# Patient Record
Sex: FEMALE | Race: WHITE | Employment: STUDENT | URBAN - METROPOLITAN AREA
[De-identification: names, ages, dates, MRNs, and addresses within clinical notes are randomized per-mention and may not be internally consistent; named-entity substitution may affect disease eponyms.]

---
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
## 2023-01-22 DIAGNOSIS — F41.9 ANXIETY: ICD-10-CM

## 2023-01-22 DIAGNOSIS — F42.9 OBSESSIVE-COMPULSIVE DISORDER, UNSPECIFIED TYPE: ICD-10-CM

## 2023-01-23 DIAGNOSIS — F41.9 ANXIETY: ICD-10-CM

## 2023-01-23 RX ORDER — BUPROPION HYDROCHLORIDE 150 MG/1
TABLET ORAL
Qty: 90 TABLET | Refills: 1 | Status: SHIPPED | OUTPATIENT
Start: 2023-01-23

## 2023-01-23 RX ORDER — FLUOXETINE HYDROCHLORIDE 20 MG/1
CAPSULE ORAL
Qty: 270 CAPSULE | Refills: 0 | Status: SHIPPED | OUTPATIENT
Start: 2023-01-23

## 2023-01-23 NOTE — TELEPHONE ENCOUNTER
Requested Prescriptions     Pending Prescriptions Disp Refills    buPROPion (WELLBUTRIN XL) 150 MG extended release tablet [Pharmacy Med Name: buPROPion HCL  MG TABLET] 90 tablet 1     Sig: TAKE ONE TABLET BY MOUTH EVERY MORNING     Last ov 7/6/22 physical  Last lab 8/12/22    Next ov n/a

## 2023-01-23 NOTE — TELEPHONE ENCOUNTER
Requested Prescriptions     Pending Prescriptions Disp Refills    FLUoxetine (PROZAC) 20 MG capsule [Pharmacy Med Name: FLUoxetine HCL 20 MG CAPSULE] 270 capsule 1     Sig: TAKE THREE CAPSULES BY MOUTH EVERY MORNING     Last ov 7/6/22 physical  Last lab 8/12/22  Next ov n/a

## 2023-03-23 ENCOUNTER — TELEPHONE (OUTPATIENT)
Dept: FAMILY MEDICINE CLINIC | Age: 18
End: 2023-03-23

## 2023-04-07 DIAGNOSIS — F40.10 SOCIAL ANXIETY DISORDER: ICD-10-CM

## 2023-04-07 DIAGNOSIS — I10 ESSENTIAL HYPERTENSION: ICD-10-CM

## 2023-04-07 RX ORDER — PROPRANOLOL HCL 60 MG
CAPSULE, EXTENDED RELEASE 24HR ORAL
Qty: 90 CAPSULE | Refills: 1 | Status: SHIPPED | OUTPATIENT
Start: 2023-04-07

## 2023-04-07 NOTE — TELEPHONE ENCOUNTER
Requested Prescriptions     Pending Prescriptions Disp Refills    propranolol (INDERAL LA) 60 MG extended release capsule [Pharmacy Med Name: PROPRANOLOL ER 60 MG CAPSULE] 90 capsule 1     Sig: TAKE ONE CAPSULE BY MOUTH DAILY     Last Office Visit 7/6/22  Last Labs 8/12/2022  No future visit scheduled

## 2023-04-24 DIAGNOSIS — N92.6 IRREGULAR MENSES: ICD-10-CM

## 2023-04-24 DIAGNOSIS — N91.2 AMENORRHEA: ICD-10-CM

## 2023-04-24 RX ORDER — ETONOGESTREL AND ETHINYL ESTRADIOL 11.7; 2.7 MG/1; MG/1
INSERT, EXTENDED RELEASE VAGINAL
Qty: 3 EACH | Refills: 0 | Status: SHIPPED | OUTPATIENT
Start: 2023-04-24

## 2023-04-24 NOTE — TELEPHONE ENCOUNTER
Requested Prescriptions     Pending Prescriptions Disp Refills    etonogestrel-ethinyl estradiol (NUVARING) 0.12-0.015 MG/24HR vaginal ring [Pharmacy Med Name: Kunal ROMEO]       Sig: INSERT 1 RING VAGINALLY FOR 21 DAYS, THEN REMOVE FOR 7 DAYS          Last Office Visit: 7/6/2022     Next Office Visit: Visit date not found     Last Labs: 8/12/22

## 2023-07-07 DIAGNOSIS — N92.6 IRREGULAR MENSES: ICD-10-CM

## 2023-07-07 DIAGNOSIS — N91.2 AMENORRHEA: ICD-10-CM

## 2023-07-07 RX ORDER — ETONOGESTREL AND ETHINYL ESTRADIOL 11.7; 2.7 MG/1; MG/1
INSERT, EXTENDED RELEASE VAGINAL
Qty: 3 EACH | Refills: 3 | Status: SHIPPED | OUTPATIENT
Start: 2023-07-07

## 2023-07-07 NOTE — TELEPHONE ENCOUNTER
Requested Prescriptions     Pending Prescriptions Disp Refills    etonogestrel-ethinyl estradiol (NUVARING) 0.12-0.015 MG/24HR vaginal ring [Pharmacy Med Name: Tanesha ROMEO]       Sig: INSERT 1 RING VAGINALLY FOR 21 DAYS, THEN REMOVE FOR 7 DAYS         Last OV: 7/6/2022    Last labs: 8/12/2022     F/u: no

## 2023-07-25 DIAGNOSIS — F41.9 ANXIETY: ICD-10-CM

## 2023-07-25 RX ORDER — BUPROPION HYDROCHLORIDE 150 MG/1
150 TABLET ORAL EVERY MORNING
Qty: 90 TABLET | Refills: 1 | Status: SHIPPED | OUTPATIENT
Start: 2023-07-25

## 2023-07-25 NOTE — TELEPHONE ENCOUNTER
Requested Prescriptions     Pending Prescriptions Disp Refills    buPROPion (WELLBUTRIN XL) 150 MG extended release tablet 90 tablet 1     Sig: Take 1 tablet by mouth every morning         Last OV: 7/6/2022    Last labs: 8/12/2022     F/u: no

## 2023-08-20 DIAGNOSIS — E55.9 VITAMIN D DEFICIENCY: ICD-10-CM

## 2023-08-20 DIAGNOSIS — F41.9 ANXIETY: ICD-10-CM

## 2023-08-20 DIAGNOSIS — F42.9 OBSESSIVE-COMPULSIVE DISORDER, UNSPECIFIED TYPE: ICD-10-CM

## 2023-08-21 RX ORDER — FLUOXETINE HYDROCHLORIDE 20 MG/1
CAPSULE ORAL
Qty: 270 CAPSULE | Refills: 0 | Status: SHIPPED | OUTPATIENT
Start: 2023-08-21

## 2023-08-21 RX ORDER — ERGOCALCIFEROL 1.25 MG/1
CAPSULE ORAL
Qty: 12 CAPSULE | Refills: 3 | Status: SHIPPED | OUTPATIENT
Start: 2023-08-21

## 2023-08-21 NOTE — TELEPHONE ENCOUNTER
Requested Prescriptions     Pending Prescriptions Disp Refills    vitamin D (ERGOCALCIFEROL) 1.25 MG (04266 UT) CAPS capsule [Pharmacy Med Name: VIT D2 (ERGOCAL) 1.25MG(50,000U) CP] 12 capsule 3     Sig: TAKE ONE CAPSULE BY MOUTH ONCE WEEKLY    FLUoxetine (PROZAC) 20 MG capsule [Pharmacy Med Name: FLUoxetine HCL 20 MG CAPSULE] 270 capsule 0     Sig: TAKE THREE CAPSULES BY MOUTH EVERY MORNING        Last OV: 8/12/21    Last labs: 8/12/22     F/u: None

## 2023-08-22 NOTE — TELEPHONE ENCOUNTER
Spoke with pt on 8/22/23 @ 8456. Let pt know that Rxs were sent. Pt said she would make online appt after consulting her calendar.

## 2023-08-25 ENCOUNTER — OFFICE VISIT (OUTPATIENT)
Age: 18
End: 2023-08-25
Payer: COMMERCIAL

## 2023-08-25 VITALS
OXYGEN SATURATION: 100 % | WEIGHT: 198.4 LBS | HEIGHT: 66 IN | SYSTOLIC BLOOD PRESSURE: 126 MMHG | HEART RATE: 72 BPM | BODY MASS INDEX: 31.88 KG/M2 | TEMPERATURE: 97.5 F | DIASTOLIC BLOOD PRESSURE: 84 MMHG | RESPIRATION RATE: 14 BRPM

## 2023-08-25 DIAGNOSIS — F41.9 ANXIETY: ICD-10-CM

## 2023-08-25 DIAGNOSIS — I10 ESSENTIAL HYPERTENSION: ICD-10-CM

## 2023-08-25 DIAGNOSIS — Z00.00 ROUTINE GENERAL MEDICAL EXAMINATION AT A HEALTH CARE FACILITY: Primary | ICD-10-CM

## 2023-08-25 DIAGNOSIS — F42.9 OBSESSIVE-COMPULSIVE DISORDER, UNSPECIFIED TYPE: ICD-10-CM

## 2023-08-25 DIAGNOSIS — E55.9 VITAMIN D DEFICIENCY: ICD-10-CM

## 2023-08-25 PROCEDURE — 3074F SYST BP LT 130 MM HG: CPT | Performed by: FAMILY MEDICINE

## 2023-08-25 PROCEDURE — 99395 PREV VISIT EST AGE 18-39: CPT | Performed by: FAMILY MEDICINE

## 2023-08-25 PROCEDURE — 3079F DIAST BP 80-89 MM HG: CPT | Performed by: FAMILY MEDICINE

## 2023-08-25 SDOH — ECONOMIC STABILITY: HOUSING INSECURITY
IN THE LAST 12 MONTHS, WAS THERE A TIME WHEN YOU DID NOT HAVE A STEADY PLACE TO SLEEP OR SLEPT IN A SHELTER (INCLUDING NOW)?: NO

## 2023-08-25 SDOH — ECONOMIC STABILITY: INCOME INSECURITY: HOW HARD IS IT FOR YOU TO PAY FOR THE VERY BASICS LIKE FOOD, HOUSING, MEDICAL CARE, AND HEATING?: NOT HARD AT ALL

## 2023-08-25 SDOH — ECONOMIC STABILITY: TRANSPORTATION INSECURITY
IN THE PAST 12 MONTHS, HAS LACK OF TRANSPORTATION KEPT YOU FROM MEETINGS, WORK, OR FROM GETTING THINGS NEEDED FOR DAILY LIVING?: NO

## 2023-08-25 SDOH — ECONOMIC STABILITY: FOOD INSECURITY: WITHIN THE PAST 12 MONTHS, THE FOOD YOU BOUGHT JUST DIDN'T LAST AND YOU DIDN'T HAVE MONEY TO GET MORE.: NEVER TRUE

## 2023-08-25 SDOH — ECONOMIC STABILITY: FOOD INSECURITY: WITHIN THE PAST 12 MONTHS, YOU WORRIED THAT YOUR FOOD WOULD RUN OUT BEFORE YOU GOT MONEY TO BUY MORE.: NEVER TRUE

## 2023-08-25 ASSESSMENT — PATIENT HEALTH QUESTIONNAIRE - PHQ9
SUM OF ALL RESPONSES TO PHQ QUESTIONS 1-9: 0
2. FEELING DOWN, DEPRESSED OR HOPELESS: 0
1. LITTLE INTEREST OR PLEASURE IN DOING THINGS: 0
SUM OF ALL RESPONSES TO PHQ QUESTIONS 1-9: 0
SUM OF ALL RESPONSES TO PHQ9 QUESTIONS 1 & 2: 0
SUM OF ALL RESPONSES TO PHQ QUESTIONS 1-9: 0
SUM OF ALL RESPONSES TO PHQ QUESTIONS 1-9: 0

## 2023-08-25 NOTE — PATIENT INSTRUCTIONS
Exercise goal is to increase cardiovascular exercise to = 150 minutes / week . Exercise goal is to incorporate strength training, including weights / resistance bands/ pilates/ yoga/ 40 minutes per week.

## 2023-08-25 NOTE — PROGRESS NOTES
SUBJECTIVE:   19 y.o. female for annual routine checkup. Current Outpatient Medications   Medication Sig Dispense Refill    vitamin D (ERGOCALCIFEROL) 1.25 MG (55063 UT) CAPS capsule TAKE ONE CAPSULE BY MOUTH ONCE WEEKLY 12 capsule 3    FLUoxetine (PROZAC) 20 MG capsule TAKE THREE CAPSULES BY MOUTH EVERY MORNING 270 capsule 0    buPROPion (WELLBUTRIN XL) 150 MG extended release tablet Take 1 tablet by mouth every morning 90 tablet 1    etonogestrel-ethinyl estradiol (NUVARING) 0.12-0.015 MG/24HR vaginal ring INSERT 1 RING VAGINALLY FOR 21 DAYS, THEN REMOVE FOR 7 DAYS 3 each 3    propranolol (INDERAL LA) 60 MG extended release capsule TAKE ONE CAPSULE BY MOUTH DAILY 90 capsule 1     No current facility-administered medications for this visit. Allergies: Sulfamethoxazole-trimethoprim   No LMP recorded. Last PAP:never  History of Abnormal PAP: n/a   Prior mammogram: no   Sexually active: no   LMP:8/19/23;  q 28 days. Last 5 days. Smoker: no   Alcohol: no   Caffeine: Dewey tea - 1/day . Exercise: not regular . Used to do yoga. Going back to school tomorrow- starting back on Monday. She is going to take Violin class this semester after she bought a violin last year. She was taking lessons on her own. She is studying liberal arts degree at [employer] - 2 years left. She will be in an apartment with 3 other girls who know each other and she is random, but will have her own bedroom. She feels Prozac 60 mg qd and Wellbutrin  mg qd are help with anxiety and OCD. 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 ROS:  Feeling well. No dyspnea or chest pain on exertion. No abdominal pain, change in bowel habits, black or bloody stools. No urinary tract symptoms.      GYN ROS: normal menses, no abnormal bleeding, pelvic pain or discharge, no breast pain or new or enlarging lumps on self exam. No

## 2023-09-01 ENCOUNTER — OFFICE VISIT (OUTPATIENT)
Age: 18
End: 2023-09-01
Payer: COMMERCIAL

## 2023-09-01 VITALS
SYSTOLIC BLOOD PRESSURE: 100 MMHG | RESPIRATION RATE: 12 BRPM | DIASTOLIC BLOOD PRESSURE: 80 MMHG | TEMPERATURE: 97.2 F | OXYGEN SATURATION: 98 % | WEIGHT: 199.4 LBS | BODY MASS INDEX: 32.43 KG/M2 | HEART RATE: 79 BPM

## 2023-09-01 DIAGNOSIS — E55.9 VITAMIN D DEFICIENCY: ICD-10-CM

## 2023-09-01 DIAGNOSIS — R61 EXCESSIVE SWEATING: ICD-10-CM

## 2023-09-01 DIAGNOSIS — F41.9 ANXIETY: ICD-10-CM

## 2023-09-01 DIAGNOSIS — E55.9 VITAMIN D DEFICIENCY: Primary | ICD-10-CM

## 2023-09-01 DIAGNOSIS — I10 ESSENTIAL HYPERTENSION: ICD-10-CM

## 2023-09-01 LAB
DEPRECATED RDW RBC AUTO: 15.2 % (ref 12.4–15.4)
HCT VFR BLD AUTO: 36.8 % (ref 36–48)
HGB BLD-MCNC: 12.4 G/DL (ref 12–16)
MCH RBC QN AUTO: 28.9 PG (ref 26–34)
MCHC RBC AUTO-ENTMCNC: 33.6 G/DL (ref 31–36)
MCV RBC AUTO: 86 FL (ref 80–100)
PLATELET # BLD AUTO: 323 K/UL (ref 135–450)
PMV BLD AUTO: 9.8 FL (ref 5–10.5)
RBC # BLD AUTO: 4.28 M/UL (ref 4–5.2)
WBC # BLD AUTO: 6.3 K/UL (ref 4–11)

## 2023-09-01 PROCEDURE — 99213 OFFICE O/P EST LOW 20 MIN: CPT | Performed by: FAMILY MEDICINE

## 2023-09-01 PROCEDURE — 3074F SYST BP LT 130 MM HG: CPT | Performed by: FAMILY MEDICINE

## 2023-09-01 PROCEDURE — 3079F DIAST BP 80-89 MM HG: CPT | Performed by: FAMILY MEDICINE

## 2023-09-02 LAB
25(OH)D3 SERPL-MCNC: 73.9 NG/ML
ALBUMIN SERPL-MCNC: 4.2 G/DL (ref 3.4–5)
ALBUMIN/GLOB SERPL: 1.7 {RATIO} (ref 1.1–2.2)
ALP SERPL-CCNC: 56 U/L (ref 40–129)
ALT SERPL-CCNC: 14 U/L (ref 10–40)
ANION GAP SERPL CALCULATED.3IONS-SCNC: 12 MMOL/L (ref 3–16)
AST SERPL-CCNC: 14 U/L (ref 15–37)
BILIRUB SERPL-MCNC: <0.2 MG/DL (ref 0–1)
BUN SERPL-MCNC: 9 MG/DL (ref 7–20)
CALCIUM SERPL-MCNC: 9.6 MG/DL (ref 8.3–10.6)
CHLORIDE SERPL-SCNC: 104 MMOL/L (ref 99–110)
CO2 SERPL-SCNC: 23 MMOL/L (ref 21–32)
CREAT SERPL-MCNC: 0.8 MG/DL (ref 0.6–1.1)
GFR SERPLBLD CREATININE-BSD FMLA CKD-EPI: >60 ML/MIN/{1.73_M2}
GLUCOSE SERPL-MCNC: 87 MG/DL (ref 70–99)
POTASSIUM SERPL-SCNC: 4.5 MMOL/L (ref 3.5–5.1)
PROT SERPL-MCNC: 6.7 G/DL (ref 6.4–8.2)
SODIUM SERPL-SCNC: 139 MMOL/L (ref 136–145)
TSH SERPL DL<=0.005 MIU/L-ACNC: 2.76 UIU/ML (ref 0.27–4.2)

## 2023-09-24 ENCOUNTER — PATIENT MESSAGE (OUTPATIENT)
Age: 18
End: 2023-09-24

## 2023-09-24 DIAGNOSIS — I10 ESSENTIAL HYPERTENSION: ICD-10-CM

## 2023-09-24 DIAGNOSIS — R61 EXCESSIVE SWEATING: ICD-10-CM

## 2023-09-24 DIAGNOSIS — F40.10 SOCIAL ANXIETY DISORDER: ICD-10-CM

## 2023-09-25 RX ORDER — PROPRANOLOL HCL 60 MG
CAPSULE, EXTENDED RELEASE 24HR ORAL
Qty: 90 CAPSULE | Refills: 1 | Status: SHIPPED | OUTPATIENT
Start: 2023-09-25

## 2023-09-25 NOTE — TELEPHONE ENCOUNTER
Requested Prescriptions     Pending Prescriptions Disp Refills    propranolol (INDERAL LA) 60 MG extended release capsule [Pharmacy Med Name: PROPRANOLOL ER 60 MG CAPSULE] 90 capsule 1     Sig: TAKE ONE CAPSULE BY MOUTH DAILY        Last OV: 9/1/23    Last labs: 9/1/23     F/u: None

## 2023-11-19 DIAGNOSIS — F42.9 OBSESSIVE-COMPULSIVE DISORDER, UNSPECIFIED TYPE: ICD-10-CM

## 2023-11-19 DIAGNOSIS — F41.9 ANXIETY: ICD-10-CM

## 2023-11-20 RX ORDER — FLUOXETINE HYDROCHLORIDE 20 MG/1
CAPSULE ORAL
Qty: 270 CAPSULE | Refills: 0 | Status: SHIPPED | OUTPATIENT
Start: 2023-11-20

## 2023-11-20 NOTE — TELEPHONE ENCOUNTER
Requested Prescriptions     Pending Prescriptions Disp Refills    FLUoxetine (PROZAC) 20 MG capsule [Pharmacy Med Name: FLUoxetine HCL 20 MG CAPSULE] 270 capsule 0     Sig: TAKE 3 CAPSULES BY MOUTH EVERY MORNING         Last OV: 9/1/2023    Last labs: 9/1/2023     F/u: no

## 2024-01-26 DIAGNOSIS — F41.9 ANXIETY: ICD-10-CM

## 2024-01-29 RX ORDER — BUPROPION HYDROCHLORIDE 150 MG/1
150 TABLET ORAL EVERY MORNING
Qty: 90 TABLET | Refills: 1 | Status: SHIPPED | OUTPATIENT
Start: 2024-01-29

## 2024-01-29 NOTE — TELEPHONE ENCOUNTER
Requested Prescriptions     Pending Prescriptions Disp Refills    buPROPion (WELLBUTRIN XL) 150 MG extended release tablet [Pharmacy Med Name: buPROPion HCL  MG TABLET] 90 tablet 1     Sig: TAKE ONE TABLET BY MOUTH EVERY MORNING         Last OV: 9/1/2023    Last labs: 9/1/2023     F/u: Visit date not found

## 2024-02-20 DIAGNOSIS — F42.9 OBSESSIVE-COMPULSIVE DISORDER, UNSPECIFIED TYPE: ICD-10-CM

## 2024-02-20 DIAGNOSIS — F41.9 ANXIETY: ICD-10-CM

## 2024-02-20 RX ORDER — FLUOXETINE HYDROCHLORIDE 20 MG/1
CAPSULE ORAL
Qty: 270 CAPSULE | Refills: 0 | Status: SHIPPED | OUTPATIENT
Start: 2024-02-20

## 2024-02-20 NOTE — TELEPHONE ENCOUNTER
Requested Prescriptions     Pending Prescriptions Disp Refills    FLUoxetine (PROZAC) 20 MG capsule [Pharmacy Med Name: FLUoxetine HCL 20 MG CAPSULE] 270 capsule 0     Sig: TAKE THREE CAPSULES BY MOUTH EVERY MORNING         Last OV: 9/1/2023    Last labs: 9/1/2023     F/u: Visit date not found

## 2024-03-28 DIAGNOSIS — I10 ESSENTIAL HYPERTENSION: ICD-10-CM

## 2024-03-28 DIAGNOSIS — F40.10 SOCIAL ANXIETY DISORDER: ICD-10-CM

## 2024-03-28 RX ORDER — PROPRANOLOL HCL 60 MG
60 CAPSULE, EXTENDED RELEASE 24HR ORAL DAILY
Qty: 90 CAPSULE | Refills: 0 | Status: SHIPPED | OUTPATIENT
Start: 2024-03-28

## 2024-03-28 NOTE — TELEPHONE ENCOUNTER
Requested Prescriptions     Pending Prescriptions Disp Refills    propranolol (INDERAL LA) 60 MG extended release capsule [Pharmacy Med Name: PROPRANOLOL ER 60 MG CAPSULE] 90 capsule 1     Sig: TAKE 1 CAPSULE BY MOUTH DAILY         Last OV: 9/1/2023    Last labs: 9/1/2023     F/u: Visit date not found

## 2024-05-23 DIAGNOSIS — F41.9 ANXIETY: ICD-10-CM

## 2024-05-23 DIAGNOSIS — F42.9 OBSESSIVE-COMPULSIVE DISORDER, UNSPECIFIED TYPE: ICD-10-CM

## 2024-05-23 RX ORDER — FLUOXETINE HYDROCHLORIDE 20 MG/1
CAPSULE ORAL
Qty: 270 CAPSULE | Refills: 1 | Status: SHIPPED | OUTPATIENT
Start: 2024-05-23

## 2024-06-23 DIAGNOSIS — N91.2 AMENORRHEA: ICD-10-CM

## 2024-06-23 DIAGNOSIS — N92.6 IRREGULAR MENSES: ICD-10-CM

## 2024-06-24 RX ORDER — ETONOGESTREL AND ETHINYL ESTRADIOL VAGINAL RING .015; .12 MG/D; MG/D
RING VAGINAL
OUTPATIENT
Start: 2024-06-24

## 2024-06-24 NOTE — TELEPHONE ENCOUNTER
Requested Prescriptions     Pending Prescriptions Disp Refills    etonogestrel-ethinyl estradiol (NUVARING) 0.12-0.015 MG/24HR vaginal ring [Pharmacy Med Name: ETONOGESTREL-EE VAGINAL RING]       Sig: INSERT 1 RING VAGINALLY FOR 21 DAYS, THEN REMOVE FOR 7 DAYS         Last OV: 9/1/2023    Last labs: 9/1/2023     F/u: Visit date not found

## 2024-07-01 NOTE — PROGRESS NOTES
neurological complaints.    See patient physical/  ROS questionnaire.    Patient's allergies and medications were reviewed.  Patient's past medical, surgical, social , and family history were reviewed.    OBJECTIVE:     The patient appears well, alert, oriented x 3, in no distress, cooperative.  /60 (Site: Right Upper Arm, Position: Sitting, Cuff Size: Large Adult)   Pulse 83   Temp 97.4 °F (36.3 °C) (Temporal)   Resp 18   Ht 1.67 m (5' 5.75\")   Wt 102.3 kg (225 lb 9.6 oz)   LMP 06/22/2024 (Exact Date)   SpO2 100%   BMI 36.69 kg/m²    There were no vitals filed for this visit.    HEENT: normocephalic, atraumatic, PERRLA, EOMI, tympanic membranes and nasopharynx are normal.  Neck : supple. No adenopathy or thyromegaly. FROM.  Upper extremities : DTRs 2+ biceps/ triceps/ brachioradialis bilateral.  FROM. Strength 5/5.   Lungs are clear, good air entry, no wheezes, rhonchi or rales. Breathing comfortably.  Cardiovascular: Regular rate  and rhythm.  S1 and S2 are normal, no murmurs,rubs, and gallops. No edema.   Abdomen is soft without tenderness, guarding, mass or organomegaly. Normal bowel sounds. Non distended.  Breasts are symmetric.  No dominant, discrete, fixed  or suspicious masses are noted.  No skin or nipple changes or axillary nodes.   : Vagina and vulva are normal;  no discharge is noted.  Cervix normal without lesions. Uterus anteverted and mobile, normal in size and shape without tenderness.  Adnexa normal in size without masses or tenderness. Pap Smear - is completed today.   Back: Cervical, thoracic and lumbar spine exam is normal without tenderness, masses or kyphoscoliosis. Full range of motion without pain is noted.  Lower extremities : DTRs 2+ knees and ankles bilateral.  FROM. Strength 5/5. Negative straight leg-raise. No edema or erythema bilateral.  normal peripheral pulses.   Neuro: Cranial nerves 2-12 are normal. Deep tendon reflexes are 2+ and equal to all extremities.  No focal

## 2024-07-02 ENCOUNTER — OFFICE VISIT (OUTPATIENT)
Age: 19
End: 2024-07-02
Payer: COMMERCIAL

## 2024-07-02 VITALS
TEMPERATURE: 97.4 F | RESPIRATION RATE: 18 BRPM | OXYGEN SATURATION: 100 % | SYSTOLIC BLOOD PRESSURE: 100 MMHG | HEART RATE: 83 BPM | DIASTOLIC BLOOD PRESSURE: 60 MMHG | HEIGHT: 66 IN | BODY MASS INDEX: 36.26 KG/M2 | WEIGHT: 225.6 LBS

## 2024-07-02 DIAGNOSIS — E55.9 VITAMIN D DEFICIENCY: ICD-10-CM

## 2024-07-02 DIAGNOSIS — F41.9 ANXIETY: ICD-10-CM

## 2024-07-02 DIAGNOSIS — N92.6 IRREGULAR MENSES: ICD-10-CM

## 2024-07-02 DIAGNOSIS — E66.01 SEVERE OBESITY (BMI 35.0-39.9) WITH COMORBIDITY (HCC): ICD-10-CM

## 2024-07-02 DIAGNOSIS — I10 ESSENTIAL HYPERTENSION: ICD-10-CM

## 2024-07-02 DIAGNOSIS — Z00.00 ROUTINE GENERAL MEDICAL EXAMINATION AT A HEALTH CARE FACILITY: Primary | ICD-10-CM

## 2024-07-02 DIAGNOSIS — F41.8 DEPRESSION WITH ANXIETY: ICD-10-CM

## 2024-07-02 DIAGNOSIS — F42.9 OBSESSIVE-COMPULSIVE DISORDER, UNSPECIFIED TYPE: ICD-10-CM

## 2024-07-02 DIAGNOSIS — Z01.419 WOMEN'S ANNUAL ROUTINE GYNECOLOGICAL EXAMINATION: ICD-10-CM

## 2024-07-02 PROCEDURE — 3074F SYST BP LT 130 MM HG: CPT | Performed by: FAMILY MEDICINE

## 2024-07-02 PROCEDURE — 3078F DIAST BP <80 MM HG: CPT | Performed by: FAMILY MEDICINE

## 2024-07-02 PROCEDURE — 99213 OFFICE O/P EST LOW 20 MIN: CPT | Performed by: FAMILY MEDICINE

## 2024-07-02 PROCEDURE — 99395 PREV VISIT EST AGE 18-39: CPT | Performed by: FAMILY MEDICINE

## 2024-07-02 RX ORDER — ETONOGESTREL AND ETHINYL ESTRADIOL VAGINAL RING .015; .12 MG/D; MG/D
RING VAGINAL
Qty: 3 EACH | Refills: 3 | Status: SHIPPED | OUTPATIENT
Start: 2024-07-02

## 2024-07-02 RX ORDER — BUPROPION HYDROCHLORIDE 300 MG/1
300 TABLET ORAL EVERY MORNING
Qty: 30 TABLET | Refills: 1 | Status: SHIPPED | OUTPATIENT
Start: 2024-07-02

## 2024-07-02 ASSESSMENT — PATIENT HEALTH QUESTIONNAIRE - PHQ9
SUM OF ALL RESPONSES TO PHQ QUESTIONS 1-9: 2
1. LITTLE INTEREST OR PLEASURE IN DOING THINGS: SEVERAL DAYS
SUM OF ALL RESPONSES TO PHQ QUESTIONS 1-9: 2
SUM OF ALL RESPONSES TO PHQ QUESTIONS 1-9: 2
2. FEELING DOWN, DEPRESSED OR HOPELESS: SEVERAL DAYS
SUM OF ALL RESPONSES TO PHQ9 QUESTIONS 1 & 2: 2
SUM OF ALL RESPONSES TO PHQ QUESTIONS 1-9: 2

## 2024-07-03 DIAGNOSIS — F40.10 SOCIAL ANXIETY DISORDER: ICD-10-CM

## 2024-07-03 DIAGNOSIS — I10 ESSENTIAL HYPERTENSION: ICD-10-CM

## 2024-07-03 RX ORDER — PROPRANOLOL HCL 60 MG
60 CAPSULE, EXTENDED RELEASE 24HR ORAL DAILY
Qty: 90 CAPSULE | Refills: 0 | Status: SHIPPED | OUTPATIENT
Start: 2024-07-03

## 2024-07-03 NOTE — TELEPHONE ENCOUNTER
Requested Prescriptions     Pending Prescriptions Disp Refills    propranolol (INDERAL LA) 60 MG extended release capsule [Pharmacy Med Name: PROPRANOLOL ER 60 MG CAPSULE] 90 capsule 0     Sig: TAKE 1 CAPSULE BY MOUTH DAILY         Last OV: 7/2/2024    Last labs: 9/1/2023     F/u: 7/3/2024

## 2024-07-05 LAB
C TRACH DNA CVX QL NAA+PROBE: NEGATIVE
N GONORRHOEA DNA SPEC QL NAA+PROBE: NEGATIVE

## 2024-08-05 PROBLEM — E66.9 OBESITY (BMI 30-39.9): Status: ACTIVE | Noted: 2024-08-05

## 2024-08-05 NOTE — PROGRESS NOTES
Patient is here for follow up of Depression/ anxiety / OCD.  Wellbutrin XL was increased to 300 mg qd and Prozac 60 mg qd was continued.  Mood is okay.  She has not noticed as much of a change , but her sister noticed the difference.  She is noticing more energy and napping less.  She wants to be \"cured\", and realizes that is not exactly possible.  She realizes depression / anxiety / OCD is an ongoing issue she will face.  No suicidal or homicidal ideation.     Weight is stable.      Exercise is sporadic.  Does yoga and walking.  She walks sporadically. Walked yesterday with her sister- 7pm.   Still working at [employer] and has early am shifts.      Nutrition goal was to increase fruits & vegetables and decrease processed foods/ red meat/  concentrated sweets/ beverages . She is working on diet still    She started using Drysol  1 month ago and helps.  - face , neck , hairline. She would like a refill.    Review of Systems    ROS: All other systems were reviewed and are negative .  Patient's allergies and medications were reviewed.  Patient's past medical, surgical, social , and family history were reviewed.      OBJECTIVE:  /78   Pulse 92   Temp 98.2 °F (36.8 °C)   Resp 14   Ht 1.67 m (5' 5.75\")   Wt 103.1 kg (227 lb 3.2 oz)   SpO2 98%   BMI 36.95 kg/m²   There were no vitals filed for this visit.    Wt Readings from Last 3 Encounters:   08/06/24 103.1 kg (227 lb 3.2 oz)   07/02/24 102.3 kg (225 lb 9.6 oz)   09/01/23 90.4 kg (199 lb 6.4 oz)         Physical Exam  Vitals:    08/06/24 1135   BP: 120/78   Pulse: 92   Resp: 14   Temp: 98.2 °F (36.8 °C)   SpO2: 98%         Body mass index is 36.95 kg/m².    General: NAD, cooperative, alert and oriented X 3. Mood / affect is good.good insight. well hydrated.  Neck : no lymphadenopathy, supple, FROM  CV: Regular rate and rhythm , no murmurs/ rub/ gallop. No edema.   Lungs : CTA bilaterally, breathing comfortably  Abdomen: positive bowel sounds, soft , non

## 2024-08-06 ENCOUNTER — OFFICE VISIT (OUTPATIENT)
Age: 19
End: 2024-08-06
Payer: COMMERCIAL

## 2024-08-06 VITALS
DIASTOLIC BLOOD PRESSURE: 78 MMHG | BODY MASS INDEX: 36.52 KG/M2 | TEMPERATURE: 98.2 F | HEIGHT: 66 IN | OXYGEN SATURATION: 98 % | SYSTOLIC BLOOD PRESSURE: 120 MMHG | WEIGHT: 227.2 LBS | HEART RATE: 92 BPM | RESPIRATION RATE: 14 BRPM

## 2024-08-06 DIAGNOSIS — F41.8 DEPRESSION WITH ANXIETY: Primary | ICD-10-CM

## 2024-08-06 DIAGNOSIS — R61 EXCESSIVE SWEATING: ICD-10-CM

## 2024-08-06 DIAGNOSIS — I10 ESSENTIAL HYPERTENSION: ICD-10-CM

## 2024-08-06 DIAGNOSIS — E55.9 VITAMIN D DEFICIENCY: ICD-10-CM

## 2024-08-06 DIAGNOSIS — F42.9 OBSESSIVE-COMPULSIVE DISORDER, UNSPECIFIED TYPE: ICD-10-CM

## 2024-08-06 DIAGNOSIS — E66.9 OBESITY (BMI 30-39.9): ICD-10-CM

## 2024-08-06 PROCEDURE — 99214 OFFICE O/P EST MOD 30 MIN: CPT | Performed by: FAMILY MEDICINE

## 2024-08-06 PROCEDURE — 3078F DIAST BP <80 MM HG: CPT | Performed by: FAMILY MEDICINE

## 2024-08-06 PROCEDURE — 3074F SYST BP LT 130 MM HG: CPT | Performed by: FAMILY MEDICINE

## 2024-08-06 RX ORDER — BUPROPION HYDROCHLORIDE 300 MG/1
300 TABLET ORAL EVERY MORNING
Qty: 90 TABLET | Refills: 1 | Status: SHIPPED | OUTPATIENT
Start: 2024-08-06

## 2024-08-22 DIAGNOSIS — F41.8 DEPRESSION WITH ANXIETY: ICD-10-CM

## 2024-08-22 DIAGNOSIS — F42.9 OBSESSIVE-COMPULSIVE DISORDER, UNSPECIFIED TYPE: ICD-10-CM

## 2024-08-22 RX ORDER — BUPROPION HYDROCHLORIDE 300 MG/1
300 TABLET ORAL EVERY MORNING
Qty: 90 TABLET | Refills: 1 | Status: SHIPPED | OUTPATIENT
Start: 2024-08-22

## 2024-08-22 NOTE — TELEPHONE ENCOUNTER
Requested Prescriptions     Pending Prescriptions Disp Refills    buPROPion (WELLBUTRIN XL) 300 MG extended release tablet [Pharmacy Med Name: BUPROPION HCL  MG TABLET] 30 tablet      Sig: TAKE 1 TABLET BY MOUTH EVERY MORNING      Last Visit: 8/6/24    Last Labs: 9/1/23    Next Visit: 11/7/24

## 2024-08-22 NOTE — TELEPHONE ENCOUNTER
I sent a 90 day RX for Wellbutrin  mg qd on 8/6/24.  I resent RX today, but please call the pharmacy to ensure it was received and no issues and inform the patient .

## 2024-09-30 DIAGNOSIS — I10 ESSENTIAL HYPERTENSION: ICD-10-CM

## 2024-09-30 DIAGNOSIS — F40.10 SOCIAL ANXIETY DISORDER: ICD-10-CM

## 2024-09-30 RX ORDER — PROPRANOLOL HCL 60 MG
60 CAPSULE, EXTENDED RELEASE 24HR ORAL DAILY
Qty: 90 CAPSULE | Refills: 0 | Status: SHIPPED | OUTPATIENT
Start: 2024-09-30

## 2024-09-30 NOTE — TELEPHONE ENCOUNTER
Requested Prescriptions     Pending Prescriptions Disp Refills    propranolol (INDERAL LA) 60 MG extended release capsule [Pharmacy Med Name: PROPRANOLOL ER 60 MG CAPSULE] 90 capsule 0     Sig: TAKE 1 CAPSULE BY MOUTH DAILY         Last OV: 8/6/2024    Last labs: 9/1/2023     F/u: 11/7/2024

## 2024-11-06 SDOH — ECONOMIC STABILITY: INCOME INSECURITY: HOW HARD IS IT FOR YOU TO PAY FOR THE VERY BASICS LIKE FOOD, HOUSING, MEDICAL CARE, AND HEATING?: NOT HARD AT ALL

## 2024-11-06 SDOH — ECONOMIC STABILITY: FOOD INSECURITY: WITHIN THE PAST 12 MONTHS, YOU WORRIED THAT YOUR FOOD WOULD RUN OUT BEFORE YOU GOT MONEY TO BUY MORE.: NEVER TRUE

## 2024-11-06 SDOH — ECONOMIC STABILITY: FOOD INSECURITY: WITHIN THE PAST 12 MONTHS, THE FOOD YOU BOUGHT JUST DIDN'T LAST AND YOU DIDN'T HAVE MONEY TO GET MORE.: NEVER TRUE

## 2024-11-07 ENCOUNTER — OFFICE VISIT (OUTPATIENT)
Age: 19
End: 2024-11-07

## 2024-11-07 VITALS
HEART RATE: 98 BPM | WEIGHT: 231.8 LBS | RESPIRATION RATE: 14 BRPM | DIASTOLIC BLOOD PRESSURE: 82 MMHG | OXYGEN SATURATION: 99 % | SYSTOLIC BLOOD PRESSURE: 112 MMHG | BODY MASS INDEX: 37.25 KG/M2 | TEMPERATURE: 97.4 F | HEIGHT: 66 IN

## 2024-11-07 DIAGNOSIS — Z23 NEED FOR INFLUENZA VACCINATION: ICD-10-CM

## 2024-11-07 DIAGNOSIS — E55.9 VITAMIN D DEFICIENCY: ICD-10-CM

## 2024-11-07 DIAGNOSIS — F40.10 SOCIAL ANXIETY DISORDER: ICD-10-CM

## 2024-11-07 DIAGNOSIS — I10 ESSENTIAL HYPERTENSION: Primary | ICD-10-CM

## 2024-11-07 DIAGNOSIS — F41.8 DEPRESSION WITH ANXIETY: ICD-10-CM

## 2024-11-07 DIAGNOSIS — E66.9 OBESITY (BMI 30-39.9): ICD-10-CM

## 2024-11-07 DIAGNOSIS — B35.1 ONYCHOMYCOSIS OF TOENAIL: ICD-10-CM

## 2024-11-07 DIAGNOSIS — F42.9 OBSESSIVE-COMPULSIVE DISORDER, UNSPECIFIED TYPE: ICD-10-CM

## 2024-11-07 DIAGNOSIS — L03.031 PARONYCHIA OF GREAT TOE OF RIGHT FOOT: ICD-10-CM

## 2024-11-07 RX ORDER — DOXYCYCLINE HYCLATE 100 MG
100 TABLET ORAL 2 TIMES DAILY
Qty: 20 TABLET | Refills: 0 | Status: SHIPPED | OUTPATIENT
Start: 2024-11-07 | End: 2024-11-17

## 2024-11-07 RX ORDER — PROPRANOLOL HYDROCHLORIDE 60 MG/1
60 CAPSULE, EXTENDED RELEASE ORAL DAILY
Qty: 90 CAPSULE | Refills: 1 | Status: SHIPPED | OUTPATIENT
Start: 2024-11-07

## 2024-11-07 RX ORDER — BUPROPION HYDROCHLORIDE 300 MG/1
300 TABLET ORAL EVERY MORNING
Qty: 90 TABLET | Refills: 1 | Status: SHIPPED | OUTPATIENT
Start: 2024-11-07

## 2024-11-07 SDOH — ECONOMIC STABILITY: FOOD INSECURITY: WITHIN THE PAST 12 MONTHS, THE FOOD YOU BOUGHT JUST DIDN'T LAST AND YOU DIDN'T HAVE MONEY TO GET MORE.: NEVER TRUE

## 2024-11-07 SDOH — ECONOMIC STABILITY: INCOME INSECURITY: HOW HARD IS IT FOR YOU TO PAY FOR THE VERY BASICS LIKE FOOD, HOUSING, MEDICAL CARE, AND HEATING?: NOT HARD AT ALL

## 2024-11-07 SDOH — ECONOMIC STABILITY: FOOD INSECURITY: WITHIN THE PAST 12 MONTHS, YOU WORRIED THAT YOUR FOOD WOULD RUN OUT BEFORE YOU GOT MONEY TO BUY MORE.: NEVER TRUE

## 2024-11-07 NOTE — PROGRESS NOTES
Immunizations Administered       Name Date Dose Route    Influenza, FLUCELVAX, (age 6 mo+) IM, Trivalent PF, 0.5mL 11/7/2024 0.5 mL Intramuscular    Site: Deltoid- Left    Lot: [id number]    NDC: 29899-502-79          [facility]  
Need for influenza vaccination  - Influenza, FLUCELVAX Trivalent, (age 6 mo+) IM, Preservative Free, 0.5mL

## 2024-11-22 DIAGNOSIS — I10 ESSENTIAL HYPERTENSION: ICD-10-CM

## 2024-11-22 DIAGNOSIS — E55.9 VITAMIN D DEFICIENCY: ICD-10-CM

## 2024-11-22 LAB
25(OH)D3 SERPL-MCNC: 39.8 NG/ML
ALBUMIN SERPL-MCNC: 4 G/DL (ref 3.4–5)
ALBUMIN/GLOB SERPL: 1.6 {RATIO} (ref 1.1–2.2)
ALP SERPL-CCNC: 63 U/L (ref 40–129)
ALT SERPL-CCNC: 19 U/L (ref 10–40)
ANION GAP SERPL CALCULATED.3IONS-SCNC: 10 MMOL/L (ref 3–16)
AST SERPL-CCNC: 22 U/L (ref 15–37)
BILIRUB SERPL-MCNC: <0.2 MG/DL (ref 0–1)
BUN SERPL-MCNC: 12 MG/DL (ref 7–20)
CALCIUM SERPL-MCNC: 9.1 MG/DL (ref 8.3–10.6)
CHLORIDE SERPL-SCNC: 105 MMOL/L (ref 99–110)
CO2 SERPL-SCNC: 22 MMOL/L (ref 21–32)
CREAT SERPL-MCNC: 0.8 MG/DL (ref 0.6–1.1)
GFR SERPLBLD CREATININE-BSD FMLA CKD-EPI: >90 ML/MIN/{1.73_M2}
GLUCOSE SERPL-MCNC: 84 MG/DL (ref 70–99)
POTASSIUM SERPL-SCNC: 4.2 MMOL/L (ref 3.5–5.1)
PROT SERPL-MCNC: 6.5 G/DL (ref 6.4–8.2)
SODIUM SERPL-SCNC: 137 MMOL/L (ref 136–145)

## 2025-05-01 ENCOUNTER — PATIENT MESSAGE (OUTPATIENT)
Age: 20
End: 2025-05-01

## 2025-05-01 DIAGNOSIS — L03.011 PARONYCHIA OF RIGHT THUMB: Primary | ICD-10-CM

## 2025-05-01 RX ORDER — DOXYCYCLINE HYCLATE 100 MG
100 TABLET ORAL 2 TIMES DAILY
Qty: 20 TABLET | Refills: 0 | Status: SHIPPED | OUTPATIENT
Start: 2025-05-01 | End: 2025-05-11

## 2025-05-06 DIAGNOSIS — F42.9 OBSESSIVE-COMPULSIVE DISORDER, UNSPECIFIED TYPE: ICD-10-CM

## 2025-05-06 DIAGNOSIS — F41.8 DEPRESSION WITH ANXIETY: ICD-10-CM

## 2025-05-06 RX ORDER — BUPROPION HYDROCHLORIDE 300 MG/1
300 TABLET ORAL EVERY MORNING
Qty: 90 TABLET | Refills: 1 | Status: SHIPPED | OUTPATIENT
Start: 2025-05-06

## 2025-05-06 NOTE — TELEPHONE ENCOUNTER
Requested Prescriptions     Pending Prescriptions Disp Refills    buPROPion (WELLBUTRIN XL) 300 MG extended release tablet [Pharmacy Med Name: buPROPion HCL  MG TABLET] 90 tablet 1     Sig: TAKE 1 TABLET BY MOUTH EVERY MORNING    FLUoxetine (PROZAC) 20 MG capsule [Pharmacy Med Name: FLUoxetine HCL 20 MG CAPSULE] 270 capsule 1     Sig: TAKE THREE CAPSULES BY MOUTH EVERY MORNING      Last Visit: 11/7/24    Last Labs: 11/22/24    Next Visit: 5/7/25

## 2025-05-31 DIAGNOSIS — N92.6 IRREGULAR MENSES: ICD-10-CM

## 2025-05-31 RX ORDER — ETONOGESTREL AND ETHINYL ESTRADIOL VAGINAL RING .015; .12 MG/D; MG/D
RING VAGINAL
Qty: 3 EACH | Refills: 1 | Status: SHIPPED | OUTPATIENT
Start: 2025-05-31

## 2025-06-24 DIAGNOSIS — I10 ESSENTIAL HYPERTENSION: ICD-10-CM

## 2025-06-24 DIAGNOSIS — F40.10 SOCIAL ANXIETY DISORDER: ICD-10-CM

## 2025-06-24 RX ORDER — PROPRANOLOL HYDROCHLORIDE 60 MG/1
60 CAPSULE, EXTENDED RELEASE ORAL DAILY
Qty: 90 CAPSULE | Refills: 1 | Status: SHIPPED | OUTPATIENT
Start: 2025-06-24

## 2025-06-24 NOTE — TELEPHONE ENCOUNTER
Requested Prescriptions     Pending Prescriptions Disp Refills    propranolol (INDERAL LA) 60 MG extended release capsule [Pharmacy Med Name: PROPRANOLOL ER 60 MG CAPSULE] 90 capsule 1     Sig: TAKE 1 CAPSULE BY MOUTH DAILY      Last Visit: 11/7/24    Last Labs: 11/22/24    Next Visit: None    Left message for pt to schedule Physical.